# Patient Record
Sex: FEMALE | Race: WHITE | HISPANIC OR LATINO | Employment: OTHER | URBAN - METROPOLITAN AREA
[De-identification: names, ages, dates, MRNs, and addresses within clinical notes are randomized per-mention and may not be internally consistent; named-entity substitution may affect disease eponyms.]

---

## 2018-05-05 ENCOUNTER — HOSPITAL ENCOUNTER (EMERGENCY)
Facility: HOSPITAL | Age: 43
Discharge: HOME | End: 2018-05-05
Attending: EMERGENCY MEDICINE
Payer: MEDICAID

## 2018-05-05 VITALS
DIASTOLIC BLOOD PRESSURE: 76 MMHG | BODY MASS INDEX: 41.95 KG/M2 | RESPIRATION RATE: 18 BRPM | HEART RATE: 82 BPM | SYSTOLIC BLOOD PRESSURE: 136 MMHG | HEIGHT: 70 IN | TEMPERATURE: 99 F | OXYGEN SATURATION: 96 % | WEIGHT: 293 LBS

## 2018-05-05 DIAGNOSIS — R11.2 NON-INTRACTABLE VOMITING WITH NAUSEA, UNSPECIFIED VOMITING TYPE: ICD-10-CM

## 2018-05-05 DIAGNOSIS — R10.9 ABDOMINAL PAIN, UNSPECIFIED ABDOMINAL LOCATION: Primary | ICD-10-CM

## 2018-05-05 DIAGNOSIS — E87.5 HYPERKALEMIA: ICD-10-CM

## 2018-05-05 DIAGNOSIS — R19.7 DIARRHEA, UNSPECIFIED TYPE: ICD-10-CM

## 2018-05-05 LAB
ALBUMIN SERPL BCP-MCNC: 3.6 G/DL
ALP SERPL-CCNC: 55 U/L
ALT SERPL W/O P-5'-P-CCNC: 27 U/L
ANION GAP SERPL CALC-SCNC: 14 MMOL/L
AST SERPL-CCNC: 38 U/L
BASOPHILS # BLD AUTO: 0.02 K/UL
BASOPHILS NFR BLD: 0.2 %
BILIRUB SERPL-MCNC: 0.3 MG/DL
BUN SERPL-MCNC: 15 MG/DL
CALCIUM SERPL-MCNC: 8.9 MG/DL
CHLORIDE SERPL-SCNC: 98 MMOL/L
CO2 SERPL-SCNC: 22 MMOL/L
CREAT SERPL-MCNC: 0.7 MG/DL
DIFFERENTIAL METHOD: ABNORMAL
EOSINOPHIL # BLD AUTO: 0.1 K/UL
EOSINOPHIL NFR BLD: 0.7 %
ERYTHROCYTE [DISTWIDTH] IN BLOOD BY AUTOMATED COUNT: 16.1 %
EST. GFR  (AFRICAN AMERICAN): >60 ML/MIN/1.73 M^2
EST. GFR  (NON AFRICAN AMERICAN): >60 ML/MIN/1.73 M^2
GLUCOSE SERPL-MCNC: 188 MG/DL
HCT VFR BLD AUTO: 35.6 %
HGB BLD-MCNC: 11.3 G/DL
LIPASE SERPL-CCNC: 16 U/L
LYMPHOCYTES # BLD AUTO: 1.1 K/UL
LYMPHOCYTES NFR BLD: 8.4 %
MCH RBC QN AUTO: 26 PG
MCHC RBC AUTO-ENTMCNC: 31.7 G/DL
MCV RBC AUTO: 82 FL
MONOCYTES # BLD AUTO: 0.6 K/UL
MONOCYTES NFR BLD: 4.5 %
NEUTROPHILS # BLD AUTO: 11.4 K/UL
NEUTROPHILS NFR BLD: 86.2 %
PLATELET # BLD AUTO: 354 K/UL
PMV BLD AUTO: 8.7 FL
POCT GLUCOSE: 185 MG/DL (ref 70–110)
POTASSIUM SERPL-SCNC: 5.6 MMOL/L
PROT SERPL-MCNC: 8.3 G/DL
RBC # BLD AUTO: 4.35 M/UL
SODIUM SERPL-SCNC: 134 MMOL/L
WBC # BLD AUTO: 13.18 K/UL

## 2018-05-05 PROCEDURE — 25000003 PHARM REV CODE 250: Performed by: EMERGENCY MEDICINE

## 2018-05-05 PROCEDURE — 96374 THER/PROPH/DIAG INJ IV PUSH: CPT

## 2018-05-05 PROCEDURE — 63600175 PHARM REV CODE 636 W HCPCS: Performed by: EMERGENCY MEDICINE

## 2018-05-05 PROCEDURE — 96361 HYDRATE IV INFUSION ADD-ON: CPT

## 2018-05-05 PROCEDURE — 82962 GLUCOSE BLOOD TEST: CPT

## 2018-05-05 PROCEDURE — 85025 COMPLETE CBC W/AUTO DIFF WBC: CPT

## 2018-05-05 PROCEDURE — 96375 TX/PRO/DX INJ NEW DRUG ADDON: CPT

## 2018-05-05 PROCEDURE — 83690 ASSAY OF LIPASE: CPT

## 2018-05-05 PROCEDURE — 99285 EMERGENCY DEPT VISIT HI MDM: CPT | Mod: 25

## 2018-05-05 PROCEDURE — 80053 COMPREHEN METABOLIC PANEL: CPT

## 2018-05-05 PROCEDURE — C9113 INJ PANTOPRAZOLE SODIUM, VIA: HCPCS | Performed by: EMERGENCY MEDICINE

## 2018-05-05 RX ORDER — HYDROXYZINE HYDROCHLORIDE 25 MG/1
25 TABLET, FILM COATED ORAL 3 TIMES DAILY PRN
COMMUNITY

## 2018-05-05 RX ORDER — PANTOPRAZOLE SODIUM 40 MG/1
40 TABLET, DELAYED RELEASE ORAL DAILY
COMMUNITY

## 2018-05-05 RX ORDER — GLIPIZIDE 5 MG/1
5 TABLET ORAL
COMMUNITY

## 2018-05-05 RX ORDER — METOPROLOL SUCCINATE 50 MG/1
50 TABLET, EXTENDED RELEASE ORAL DAILY
COMMUNITY

## 2018-05-05 RX ORDER — LISINOPRIL AND HYDROCHLOROTHIAZIDE 10; 12.5 MG/1; MG/1
1 TABLET ORAL DAILY
COMMUNITY

## 2018-05-05 RX ORDER — LANOLIN ALCOHOL/MO/W.PET/CERES
400 CREAM (GRAM) TOPICAL DAILY
COMMUNITY

## 2018-05-05 RX ORDER — INSULIN GLARGINE 100 [IU]/ML
20 INJECTION, SOLUTION SUBCUTANEOUS NIGHTLY
COMMUNITY

## 2018-05-05 RX ORDER — FERROUS GLUCONATE 324(38)MG
27 TABLET ORAL
COMMUNITY

## 2018-05-05 RX ORDER — GEMFIBROZIL 600 MG/1
600 TABLET, FILM COATED ORAL
COMMUNITY

## 2018-05-05 RX ORDER — GABAPENTIN 300 MG/1
300 CAPSULE ORAL 3 TIMES DAILY
COMMUNITY

## 2018-05-05 RX ORDER — DICYCLOMINE HYDROCHLORIDE 20 MG/1
20 TABLET ORAL 4 TIMES DAILY
Qty: 16 TABLET | Refills: 0 | Status: SHIPPED | OUTPATIENT
Start: 2018-05-05

## 2018-05-05 RX ORDER — CHOLECALCIFEROL (VITAMIN D3) 25 MCG
2000 TABLET ORAL DAILY
COMMUNITY

## 2018-05-05 RX ORDER — LEVOTHYROXINE SODIUM 50 UG/1
50 TABLET ORAL DAILY
COMMUNITY

## 2018-05-05 RX ORDER — METFORMIN HYDROCHLORIDE 1000 MG/1
1000 TABLET ORAL 2 TIMES DAILY WITH MEALS
COMMUNITY

## 2018-05-05 RX ORDER — ONDANSETRON 4 MG/1
4 TABLET, ORALLY DISINTEGRATING ORAL EVERY 8 HOURS PRN
Qty: 12 TABLET | Refills: 0 | Status: SHIPPED | OUTPATIENT
Start: 2018-05-05

## 2018-05-05 RX ORDER — ALBUTEROL SULFATE 0.83 MG/ML
2.5 SOLUTION RESPIRATORY (INHALATION) EVERY 6 HOURS PRN
COMMUNITY

## 2018-05-05 RX ORDER — ONDANSETRON 2 MG/ML
4 INJECTION INTRAMUSCULAR; INTRAVENOUS ONCE
Status: COMPLETED | OUTPATIENT
Start: 2018-05-05 | End: 2018-05-05

## 2018-05-05 RX ORDER — FLUTICASONE FUROATE AND VILANTEROL 100; 25 UG/1; UG/1
1 POWDER RESPIRATORY (INHALATION) DAILY
COMMUNITY

## 2018-05-05 RX ADMIN — SODIUM CHLORIDE 1000 ML: 0.9 INJECTION, SOLUTION INTRAVENOUS at 02:05

## 2018-05-05 RX ADMIN — ONDANSETRON 4 MG: 2 INJECTION INTRAMUSCULAR; INTRAVENOUS at 02:05

## 2018-05-05 RX ADMIN — LIDOCAINE HYDROCHLORIDE 50 ML: 20 SOLUTION ORAL; TOPICAL at 02:05

## 2018-05-05 RX ADMIN — PANTOPRAZOLE SODIUM 40 MG: 40 INJECTION, POWDER, FOR SOLUTION INTRAVENOUS at 02:05

## 2018-05-05 NOTE — ED PROVIDER NOTES
SCRIBE #1 NOTE: I, Dena Garcia, am scribing for, and in the presence of, Sinai Araiza DO. I have scribed the entire note.      History      Chief Complaint   Patient presents with    Abdominal Pain     with emesis. d/c from [facility] in [place] today but states that medications haven't helped.       Review of patient's allergies indicates:   Allergen Reactions    Sulfa (sulfonamide antibiotics)         HPI   HPI    5/5/2018, 12:53 AM   History obtained from the patient      History of Present Illness: Ara Ragland is a 42 y.o. female patient with PMHx of Diverticulosis, DM, GERD, HTN, and Bleeding gastric ulcers who presents to the Emergency Department for upper abd pain which onset gradually yesterday AM. Pt rates her pain a 9/10 in severity. Symptoms are constant and moderate in severity. No mitigating or exacerbating factors reported. Associated sxs include n/v/d. Patient denies any fever, chills, CP, SOB, constipation, hematochezia, melena, hematemesis, dysuria, hematuria, frequency, and all other sxs at this time. Prior Tx includes Zofran at 1730 prior to discharge from [facility]. Pt was evaluated at [facility] yesterday afternoon and had lab work and CT abdomen and pelvis. Pt was discharged with Carafate and Zofran. Pt states she did not take the Carafate because she is allergic to sulfa drugs. No further complaints or concerns at this time.       Arrival mode: Personal vehicle      PCP: TRAN AGUILERA III, MD (Inactive)       Past Medical History:  Past Medical History:   Diagnosis Date    COPD (chronic obstructive pulmonary disease)     Diabetes mellitus     Diverticulitis     Gastritis     GERD (gastroesophageal reflux disease)     Hypertension     Respiratory failure     Thyroid disease        Past Surgical History:  History reviewed. No pertinent surgical history.      Family History:  History reviewed. No pertinent family history.    Social History:  Social History     Social History Main  Topics    Smoking status: Never Smoker    Smokeless tobacco: Never Used    Alcohol use No    Drug use: No    Sexual activity: unknown       ROS   Review of Systems   Constitutional: Negative for chills, diaphoresis and fever.   HENT: Negative for sore throat.    Respiratory: Negative for shortness of breath.    Cardiovascular: Negative for chest pain.   Gastrointestinal: Positive for abdominal pain (upper), diarrhea, nausea and vomiting. Negative for blood in stool and constipation.        (-) hematemesis   Genitourinary: Negative for dysuria, frequency and hematuria.   Musculoskeletal: Negative for back pain.   Skin: Negative for rash.   Neurological: Negative for weakness.   Hematological: Does not bruise/bleed easily.   All other systems reviewed and are negative.      Physical Exam      Initial Vitals [05/05/18 0022]   BP Pulse Resp Temp SpO2   106/66 110 18 98.8 °F (37.1 °C) 95 %      MAP       79.33          Physical Exam  Nursing Notes and Vital Signs Reviewed.  Constitutional: Patient is in no acute distress. Well-developed and well-nourished. Obese.  Head: Atraumatic. Normocephalic.  Eyes: PERRL. EOM intact. Conjunctivae are not pale. No scleral icterus.  ENT: Mucous membranes are moist. Oropharynx is clear and symmetric.    Neck: Supple. Full ROM. No lymphadenopathy.  Cardiovascular: Regular rate. Regular rhythm. No murmurs, rubs, or gallops. Distal pulses are 2+ and symmetric.  Pulmonary/Chest: No respiratory distress. Clear to auscultation bilaterally. No wheezing or rales.  Abdominal: Soft and non-distended.  There is no tenderness.  No rebound, guarding, or rigidity. Good bowel sounds.  Musculoskeletal: Moves all extremities. No obvious deformities. No edema. No calf tenderness.  Skin: Warm and dry.  Neurological:  Alert, awake, and appropriate.  Normal speech.  No acute focal neurological deficits are appreciated.  Psychiatric: Normal affect. Good eye contact. Appropriate in content.    ED Course  "   Procedures  ED Vital Signs:  Vitals:    05/05/18 0022 05/05/18 0255   BP: 106/66    Pulse: 110    Resp: 18    Temp: 98.8 °F (37.1 °C)    TempSrc: Oral    SpO2: 95%    Weight:  (!) 137.1 kg (302 lb 4 oz)   Height: 5' 10" (1.778 m)        Abnormal Lab Results:  Labs Reviewed   CBC W/ AUTO DIFFERENTIAL - Abnormal; Notable for the following:        Result Value    WBC 13.18 (*)     Hemoglobin 11.3 (*)     Hematocrit 35.6 (*)     MCH 26.0 (*)     MCHC 31.7 (*)     RDW 16.1 (*)     Platelets 354 (*)     MPV 8.7 (*)     Gran # (ANC) 11.4 (*)     Gran% 86.2 (*)     Lymph% 8.4 (*)     All other components within normal limits   COMPREHENSIVE METABOLIC PANEL - Abnormal; Notable for the following:     Sodium 134 (*)     Potassium 5.6 (*)     CO2 22 (*)     Glucose 188 (*)     All other components within normal limits   POCT GLUCOSE - Abnormal; Notable for the following:     POCT Glucose 185 (*)     All other components within normal limits   LIPASE   POCT GLUCOSE MONITORING CONTINUOUS        All Lab Results:  Results for orders placed or performed during the hospital encounter of 05/05/18   CBC auto differential   Result Value Ref Range    WBC 13.18 (H) 3.90 - 12.70 K/uL    RBC 4.35 4.00 - 5.40 M/uL    Hemoglobin 11.3 (L) 12.0 - 16.0 g/dL    Hematocrit 35.6 (L) 37.0 - 48.5 %    MCV 82 82 - 98 fL    MCH 26.0 (L) 27.0 - 31.0 pg    MCHC 31.7 (L) 32.0 - 36.0 g/dL    RDW 16.1 (H) 11.5 - 14.5 %    Platelets 354 (H) 150 - 350 K/uL    MPV 8.7 (L) 9.2 - 12.9 fL    Gran # (ANC) 11.4 (H) 1.8 - 7.7 K/uL    Lymph # 1.1 1.0 - 4.8 K/uL    Mono # 0.6 0.3 - 1.0 K/uL    Eos # 0.1 0.0 - 0.5 K/uL    Baso # 0.02 0.00 - 0.20 K/uL    Gran% 86.2 (H) 38.0 - 73.0 %    Lymph% 8.4 (L) 18.0 - 48.0 %    Mono% 4.5 4.0 - 15.0 %    Eosinophil% 0.7 0.0 - 8.0 %    Basophil% 0.2 0.0 - 1.9 %    Differential Method Automated    Comprehensive metabolic panel   Result Value Ref Range    Sodium 134 (L) 136 - 145 mmol/L    Potassium 5.6 (H) 3.5 - 5.1 mmol/L    " Chloride 98 95 - 110 mmol/L    CO2 22 (L) 23 - 29 mmol/L    Glucose 188 (H) 70 - 110 mg/dL    BUN, Bld 15 6 - 20 mg/dL    Creatinine 0.7 0.5 - 1.4 mg/dL    Calcium 8.9 8.7 - 10.5 mg/dL    Total Protein 8.3 6.0 - 8.4 g/dL    Albumin 3.6 3.5 - 5.2 g/dL    Total Bilirubin 0.3 0.1 - 1.0 mg/dL    Alkaline Phosphatase 55 55 - 135 U/L    AST 38 10 - 40 U/L    ALT 27 10 - 44 U/L    Anion Gap 14 8 - 16 mmol/L    eGFR if African American >60 >60 mL/min/1.73 m^2    eGFR if non African American >60 >60 mL/min/1.73 m^2   Lipase   Result Value Ref Range    Lipase 16 4 - 60 U/L   POCT glucose   Result Value Ref Range    POCT Glucose 185 (H) 70 - 110 mg/dL       Imaging Results:  Imaging Results          X-Ray Abdomen Flat And Erect (In process)                2:37 AM: Per ED physician, pt's XR results: No free air. Air in large colon.         The Emergency Provider reviewed the vital signs and test results, which are outlined above.    ED Discussion     1:28 AM: Spoke with pharmacy. They state Carafate is not a sulfa drug to their knowledge but will check and call back.    1:49 AM: Spoke with pharmacy. They state Carafate does have sulfate in it.    3:52 AM: Reassessed pt at this time.  Pt states her condition has greatly improved after GI cocktail. Pt is tolerating ice chips PO. Repeat abd exam: Non-tender, no rebound, guarding, or masses. Discussed with pt all pertinent ED information and results.  Patient is able to tolerate p.o..  Discussed pt dx and plan of tx. Advised pt to f/u with her PCP. Gave pt all f/u and return to the ED instructions. All questions and concerns were addressed at this time. Pt expresses understanding of information and instructions, and is comfortable with plan to discharge. Pt is stable for discharge.    I discussed with patient and/or family/caretaker that evaluation in the ED does not suggest any emergent or life threatening medical conditions requiring immediate intervention beyond what was  provided in the ED, and I believe patient is safe for discharge.  Regardless, an unremarkable evaluation in the ED does not preclude the development or presence of a serious of life threatening condition. As such, patient was instructed to return immediately for any worsening or change in current symptoms.      ED Medication(s):  Medications   GI cocktail (mylanta 30 mL, lidocaine 2 % viscous 10 mL, dicyclomine 10 mL) 50 mL (50 mLs Oral Given 5/5/18 0203)   pantoprazole 40 mg in dextrose 5 % 100 mL infusion (ready to mix system) (0 mg Intravenous Stopped 5/5/18 0307)   ondansetron injection 4 mg (4 mg Intravenous Given 5/5/18 0203)   sodium chloride 0.9% bolus 1,000 mL (0 mLs Intravenous Stopped 5/5/18 0342)       New Prescriptions    DICYCLOMINE (BENTYL) 20 MG TABLET    Take 1 tablet (20 mg total) by mouth 4 (four) times daily.    ONDANSETRON (ZOFRAN-ODT) 4 MG TBDL    Take 1 tablet (4 mg total) by mouth every 8 (eight) hours as needed.       Follow-up Information     TRAN AGUILERA III, MD In 2 days.    Specialty:  Family Medicine  Contact information:  [address] [address]  [phone]                     Medical Decision Making    Medical Decision Making:   History:   Old Records Summarized: records from another hospital.       <> Summary of Records: Labs and Imaging results from [facility]:    CBC auto differential (05/04/2018 4:05 PM)  CBC auto differential (05/04/2018 4:05 PM)  Component Value Ref Range  WHITE BLOOD CELL COUNT 12.5 (H) 4.0 - 11.0 1000/ul  RED BLOOD CELL COUNT 4.37 3.80 - 5.30 mill/uL  HEMOGLOBIN 11.2 (L) 12.0 - 16.0 gm/dl  HEMATOCRIT 35.9 (L) 37.0 - 47.0 %  MEAN CORPUSCULAR VOLUME 82 80 - 100 fl  MEAN CORPUSCULAR HEMOGLOBIN CONC 31.1 31.0 - 37.0 gm/dl  RED CELL DISTRIBUTION WIDTH 15.7 (H) 12.1 - 14.9 %  PLATELET COUNT 388 (H) 150 - 375 1000/ul  MEAN PLATELET VOLUME 5.5 (L) 6.5 - 12.0 fl  Neutrophils Abs 9.9 1.5 - 10.0 1000/ul  Lymphocytes Abs 1.5 1.3 - 2.9 1000/ul  Monocytes  Abs 0.8 0.1 - 1.0 1000/ul  Eosinophils Abs 0.1 0.0 - 0.7 1000/ul  Basophils Abs 0.1 0.0 - 0.2 1000/ul  Neutrophils % 80 44 - 81 %  Lymphocytes % 12 (L) 21 - 47 %  Monocytes % 7 2 - 11 %  Eosinophils % 1 0 - 7 %  Basophils % 1 0 - 2 %      Lipase (05/04/2018 4:05 PM)  Component Value Ref Range  Lipase Level 84 (H) 8 - 78 U/L        UA C&S if needed - Urine Clean Catch (05/04/2018 4:05 PM)  Component Value Ref Range  COLOR Yellow Yellow  CLARITY Slightly Cloudy (A) Clear  HGB URINE Negative Negative  MICROSCOPIC NEEDED? Yes    WBC UA 0-5 None, 0-5 hpf  Blood UA 0-1 None Seen, 0-1, 1-5 hpf  EPITHELIAL URINE 5-10 (A) None Seen, 0-1, 2-3, 3-5 hpf  URINE CULTURE INDICATED? No No  MUCUS Present (A) NONE hpf  URINE COLLECTION SOURCE Urine Clean Catch    GLUCOSE UA Negative Negative mg/dL  KETONES UA Negative Negative mg/dL  UROBILINOGEN URINE 0.2  0.2 - 2 E.U./dL  PROTEIN UA Trace Negative mg/dL  NITRITE UA Negative Negative  LEUKOCYTE ESTERASE UA Negative Negative  PH UA 6  5 - 8  SPECIFIC GRAVITY UA 1.025  1.010 - 1.025  BACTERIA Small (A) None Seen hpf  BILIRUBIN URINE Negative Negative    Comprehensive metabolic panel (05/04/2018 4:05 PM)  Component Value Ref Range  Sodium Level 140 136 - 145 mmol/L  Potassium Level 4.1 3.5 - 5.1 mmol/L  Chloride Level 102 100 - 109 mmol/L  CO2 Level 23 22 - 33 mmol/L  Glucose Level 125 (H) 70 - 100 mg/dL  Blood Urea Nitrogen Level 12 5 - 25 mg/dL  Creatinine Level 0.66 0.57 - 1.25 mg/dL  GFR- mL/min/1.73mSq  GFR-REBECCA 99 mL/min/1.73mSq  Calcium Level 9.7 8.8 - 10.6 mg/dL  Protein Total 7.8 6.0 - 8.3 g/dL  Albumin Level 3.7 3.5 - 5.0 g/dL  Bilirubin Total 0.3 0.2 - 1.2 mg/dL  Alkaline Phosphatase Level 62 40 - 150 U/L  SGOT (AST) 28 10 - 58 U/L  SGPT (ALT) 28 5 - 50 U/L  Anion Gap 15 8 - 16 mmol/L    Comprehensive metabolic panel (05/04/2018 4:05 PM)  Narrative      MDRD Calculated GFR estimate resulted by System based on Creatinine Level.  Adjusted for gender and age.  Results  calculated in ml/min/1.73mSquared.  Reference Range: >= 60 ml/min/1.73mSquared.    Back to top of Lab Results        Imaging Results      CT Abdomen Pelvis with IV Contrast Only (05/04/2018 5:01 PM)  Procedure Note  Interface, Rad Results In - 05/04/2018 5:19 PM CDT    CT ABDOMEN PELVIS W IV CONTRAST    Indication:epigastric/periumbilical pain with N/V and distention     Comparison:none    Findings: Scans through the abdomen and pelvis were performed with IV and oral contrast. Automated exposure technique was utilized for dose reduction.     Lung bases are clear.    Abdomen: No free air or free fluid. No dilated bowel loops or bowel wall thickening. Decreased attenuation of the liver indicating hepatic steatosis.. Cholecystectomy. No masses, fluid collections or adenopathy. The vascular structures are patent. No destructive bone lesion.    Pelvis: Appendix is normal in size. No free air or free fluid. No dilated bowel loops or bowel wall thickening. No urinary bladder wall thickening. Distal ureters are nondilated. No masses, fluid collections or adenopathy. The vascular structures are patent. No destructive bone lesion.    Impression:   1. No acute abnormalities.  2. Abnormal appearance of the liver most consistent with hepatic steatosis        Clinical Tests:   Lab Tests: Ordered and Reviewed  Radiological Study: Ordered and Reviewed           Scribe Attestation:   Scribe #1: I performed the above scribed service and the documentation accurately describes the services I performed. I attest to the accuracy of the note.    Attending:   Physician Attestation Statement for Scribe #1: I, Sinai Araiza DO, personally performed the services described in this documentation, as scribed by Dena Garcia, in my presence, and it is both accurate and complete.          Clinical Impression       ICD-10-CM ICD-9-CM   1. Abdominal pain, unspecified abdominal location R10.9 789.00   2. Hyperkalemia E87.5 276.7   3.  Non-intractable vomiting with nausea, unspecified vomiting type R11.2 787.01   4. Diarrhea, unspecified type R19.7 787.91       Disposition:   Disposition: Discharged  Condition: Stable         Sinai Araiza DO  05/05/18 4136

## 2018-06-01 ENCOUNTER — TELEPHONE (OUTPATIENT)
Dept: PULMONOLOGY | Facility: CLINIC | Age: 43
End: 2018-06-01

## 2018-06-01 DIAGNOSIS — J44.9 CHRONIC OBSTRUCTIVE PULMONARY DISEASE, UNSPECIFIED COPD TYPE: Primary | ICD-10-CM

## 2019-08-13 PROBLEM — M25.562 LEFT MEDIAL KNEE PAIN: Status: ACTIVE | Noted: 2019-08-13

## 2019-09-30 PROBLEM — M25.562 LEFT MEDIAL KNEE PAIN: Status: ACTIVE | Noted: 2019-09-30

## 2019-09-30 PROBLEM — M25.561 RIGHT MEDIAL KNEE PAIN: Status: ACTIVE | Noted: 2019-08-13

## 2019-11-04 PROBLEM — M17.12 LOCALIZED OSTEOARTHRITIS OF LEFT KNEE: Status: ACTIVE | Noted: 2019-11-04

## 2019-11-04 PROBLEM — S83.412A MCL SPRAIN OF LEFT KNEE: Status: ACTIVE | Noted: 2019-11-04
